# Patient Record
Sex: FEMALE | Race: BLACK OR AFRICAN AMERICAN | NOT HISPANIC OR LATINO | Employment: UNEMPLOYED | URBAN - METROPOLITAN AREA
[De-identification: names, ages, dates, MRNs, and addresses within clinical notes are randomized per-mention and may not be internally consistent; named-entity substitution may affect disease eponyms.]

---

## 2023-09-27 ENCOUNTER — HOSPITAL ENCOUNTER (EMERGENCY)
Facility: HOSPITAL | Age: 38
Discharge: HOME | End: 2023-09-27
Attending: EMERGENCY MEDICINE
Payer: MEDICAID

## 2023-09-27 ENCOUNTER — APPOINTMENT (EMERGENCY)
Dept: CT IMAGING | Facility: HOSPITAL | Age: 38
End: 2023-09-27
Payer: MEDICAID

## 2023-09-27 ENCOUNTER — APPOINTMENT (EMERGENCY)
Dept: RADIOLOGY | Facility: HOSPITAL | Age: 38
End: 2023-09-27
Payer: MEDICAID

## 2023-09-27 VITALS
SYSTOLIC BLOOD PRESSURE: 116 MMHG | RESPIRATION RATE: 22 BRPM | DIASTOLIC BLOOD PRESSURE: 70 MMHG | OXYGEN SATURATION: 94 % | TEMPERATURE: 97.9 F | HEART RATE: 84 BPM

## 2023-09-27 DIAGNOSIS — R07.89 ATYPICAL CHEST PAIN: Primary | ICD-10-CM

## 2023-09-27 LAB
ALBUMIN SERPL BCP-MCNC: 3.9 G/DL (ref 3.5–5)
ALP SERPL-CCNC: 76 U/L (ref 34–104)
ALT SERPL W P-5'-P-CCNC: 11 U/L (ref 7–52)
ANION GAP SERPL CALCULATED.3IONS-SCNC: 7 MMOL/L
AST SERPL W P-5'-P-CCNC: 24 U/L (ref 13–39)
ATRIAL RATE: 90 BPM
BASOPHILS # BLD AUTO: 0.03 THOUSANDS/ÂΜL (ref 0–0.1)
BASOPHILS NFR BLD AUTO: 0 % (ref 0–1)
BILIRUB SERPL-MCNC: 0.38 MG/DL (ref 0.2–1)
BUN SERPL-MCNC: 7 MG/DL (ref 5–25)
CALCIUM SERPL-MCNC: 9.3 MG/DL (ref 8.4–10.2)
CARDIAC TROPONIN I PNL SERPL HS: <2 NG/L
CARDIAC TROPONIN I PNL SERPL HS: <2 NG/L
CHLORIDE SERPL-SCNC: 104 MMOL/L (ref 96–108)
CO2 SERPL-SCNC: 26 MMOL/L (ref 21–32)
CREAT SERPL-MCNC: 0.65 MG/DL (ref 0.6–1.3)
D DIMER PPP FEU-MCNC: 0.38 UG/ML FEU
EOSINOPHIL # BLD AUTO: 0.03 THOUSAND/ÂΜL (ref 0–0.61)
EOSINOPHIL NFR BLD AUTO: 0 % (ref 0–6)
ERYTHROCYTE [DISTWIDTH] IN BLOOD BY AUTOMATED COUNT: 17 % (ref 11.6–15.1)
GFR SERPL CREATININE-BSD FRML MDRD: 112 ML/MIN/1.73SQ M
GLUCOSE SERPL-MCNC: 94 MG/DL (ref 65–140)
HCG SERPL QL: NEGATIVE
HCT VFR BLD AUTO: 38.7 % (ref 34.8–46.1)
HGB BLD-MCNC: 12 G/DL (ref 11.5–15.4)
IMM GRANULOCYTES # BLD AUTO: 0.03 THOUSAND/UL (ref 0–0.2)
IMM GRANULOCYTES NFR BLD AUTO: 0 % (ref 0–2)
LYMPHOCYTES # BLD AUTO: 2.57 THOUSANDS/ÂΜL (ref 0.6–4.47)
LYMPHOCYTES NFR BLD AUTO: 25 % (ref 14–44)
MCH RBC QN AUTO: 25.3 PG (ref 26.8–34.3)
MCHC RBC AUTO-ENTMCNC: 31 G/DL (ref 31.4–37.4)
MCV RBC AUTO: 82 FL (ref 82–98)
MONOCYTES # BLD AUTO: 0.81 THOUSAND/ÂΜL (ref 0.17–1.22)
MONOCYTES NFR BLD AUTO: 8 % (ref 4–12)
NEUTROPHILS # BLD AUTO: 6.86 THOUSANDS/ÂΜL (ref 1.85–7.62)
NEUTS SEG NFR BLD AUTO: 67 % (ref 43–75)
NRBC BLD AUTO-RTO: 0 /100 WBCS
P AXIS: 44 DEGREES
PLATELET # BLD AUTO: 384 THOUSANDS/UL (ref 149–390)
PMV BLD AUTO: 9.6 FL (ref 8.9–12.7)
POTASSIUM SERPL-SCNC: 3.9 MMOL/L (ref 3.5–5.3)
PR INTERVAL: 158 MS
PROT SERPL-MCNC: 8.3 G/DL (ref 6.4–8.4)
QRS AXIS: 24 DEGREES
QRSD INTERVAL: 78 MS
QT INTERVAL: 368 MS
QTC INTERVAL: 450 MS
RBC # BLD AUTO: 4.74 MILLION/UL (ref 3.81–5.12)
SODIUM SERPL-SCNC: 137 MMOL/L (ref 135–147)
T WAVE AXIS: 53 DEGREES
VENTRICULAR RATE: 90 BPM
WBC # BLD AUTO: 10.33 THOUSAND/UL (ref 4.31–10.16)

## 2023-09-27 PROCEDURE — 80053 COMPREHEN METABOLIC PANEL: CPT

## 2023-09-27 PROCEDURE — 71275 CT ANGIOGRAPHY CHEST: CPT

## 2023-09-27 PROCEDURE — 85025 COMPLETE CBC W/AUTO DIFF WBC: CPT

## 2023-09-27 PROCEDURE — 93005 ELECTROCARDIOGRAM TRACING: CPT

## 2023-09-27 PROCEDURE — 71046 X-RAY EXAM CHEST 2 VIEWS: CPT

## 2023-09-27 PROCEDURE — 96374 THER/PROPH/DIAG INJ IV PUSH: CPT

## 2023-09-27 PROCEDURE — 99285 EMERGENCY DEPT VISIT HI MDM: CPT

## 2023-09-27 PROCEDURE — 84484 ASSAY OF TROPONIN QUANT: CPT

## 2023-09-27 PROCEDURE — 84703 CHORIONIC GONADOTROPIN ASSAY: CPT

## 2023-09-27 PROCEDURE — 85379 FIBRIN DEGRADATION QUANT: CPT

## 2023-09-27 PROCEDURE — 93010 ELECTROCARDIOGRAM REPORT: CPT | Performed by: INTERNAL MEDICINE

## 2023-09-27 PROCEDURE — 36415 COLL VENOUS BLD VENIPUNCTURE: CPT

## 2023-09-27 PROCEDURE — 74174 CTA ABD&PLVS W/CONTRAST: CPT

## 2023-09-27 RX ORDER — FENTANYL CITRATE 50 UG/ML
25 INJECTION, SOLUTION INTRAMUSCULAR; INTRAVENOUS ONCE
Status: COMPLETED | OUTPATIENT
Start: 2023-09-27 | End: 2023-09-27

## 2023-09-27 RX ADMIN — IOHEXOL 100 ML: 350 INJECTION, SOLUTION INTRAVENOUS at 09:38

## 2023-09-27 RX ADMIN — FENTANYL CITRATE 25 MCG: 50 INJECTION INTRAMUSCULAR; INTRAVENOUS at 10:58

## 2023-09-27 NOTE — DISCHARGE INSTRUCTIONS
Follow up with PCP  Supportive care  Return to the ED with new or worsening symptoms including but not limited to worsening pain, shortness of breath, difficulty breathing     No acute pathology in the chest, abdomen or pelvis. No aortic aneurysm or dissection. 6 mm and 5 mm pulmonary nodules. Based on current Fleischner Society 2017 Guidelines on incidental pulmonary nodule, followup non-contrast CT is recommended at 6-12 months from the initial examination and, if stable at that time, an additional followup   is recommended for 18-24 months from the initial examination.

## 2023-09-27 NOTE — ED PROVIDER NOTES
History  Chief Complaint   Patient presents with   • Chest Pain     Pt reports pain in center of chest that began yesterday that is now radiating to back and left arm. Patient is a 40-year-old female past medical history of anemia presenting to the emergency room for evaluation of chest pain. Patient states yesterday morning she woke up with substernal chest pain. Patient states the pain worsened throughout the evening. Patient states that now radiates into her back, her left arm and to the left side. Patient reports she has never had pain like this in the past.  Patient does state when she takes a deep breath the pain worsens. Patient does report pain to palpation of the chest.  Patient does report she has MAD in place that is a hormonal birth control. Patient does report associated shortness of breath with the pain. Denies fevers, chills, rash, headache, weakness, dizziness, visual changes, abdominal pain, nausea, vomiting, diarrhea, constipation or difficulty breathing. Does not offer any other concerns or complaints. None       History reviewed. No pertinent past medical history. History reviewed. No pertinent surgical history. History reviewed. No pertinent family history. I have reviewed and agree with the history as documented. E-Cigarette/Vaping     E-Cigarette/Vaping Substances     Social History     Tobacco Use   • Smoking status: Never   • Smokeless tobacco: Never   Substance Use Topics   • Alcohol use: Not Currently   • Drug use: Never       Review of Systems   Constitutional: Negative for chills and fever. HENT: Negative for ear pain and sore throat. Eyes: Negative for pain and visual disturbance. Respiratory: Positive for shortness of breath. Negative for cough, chest tightness and wheezing. Cardiovascular: Positive for chest pain. Negative for palpitations. Gastrointestinal: Negative for abdominal pain, constipation, diarrhea, nausea and vomiting. Genitourinary: Negative for dysuria and hematuria. Musculoskeletal: Negative for arthralgias and back pain. Skin: Negative for color change and rash. Neurological: Negative for dizziness, seizures, syncope, weakness and headaches. All other systems reviewed and are negative. Physical Exam  Physical Exam  Vitals and nursing note reviewed. Constitutional:       General: She is not in acute distress. Appearance: Normal appearance. She is well-developed. She is not toxic-appearing or diaphoretic. HENT:      Head: Normocephalic and atraumatic. Right Ear: External ear normal.      Left Ear: External ear normal.      Nose: Nose normal.      Mouth/Throat:      Mouth: Mucous membranes are moist.   Eyes:      General: No scleral icterus. Right eye: No discharge. Left eye: No discharge. Conjunctiva/sclera: Conjunctivae normal.   Cardiovascular:      Rate and Rhythm: Normal rate and regular rhythm. Heart sounds: No murmur heard. Pulmonary:      Effort: Pulmonary effort is normal. No respiratory distress. Breath sounds: Normal breath sounds. No decreased breath sounds, wheezing, rhonchi or rales. Chest:      Chest wall: Tenderness present. Abdominal:      Palpations: Abdomen is soft. Tenderness: There is no abdominal tenderness. Musculoskeletal:         General: No swelling, deformity or signs of injury. Normal range of motion. Cervical back: Normal range of motion and neck supple. No rigidity. Skin:     General: Skin is warm and dry. Capillary Refill: Capillary refill takes less than 2 seconds. Coloration: Skin is not jaundiced. Findings: No erythema or rash. Neurological:      General: No focal deficit present. Mental Status: She is alert and oriented to person, place, and time. Mental status is at baseline. Cranial Nerves: No cranial nerve deficit.       Gait: Gait normal.   Psychiatric:         Mood and Affect: Mood normal. Behavior: Behavior normal.         Thought Content:  Thought content normal.         Judgment: Judgment normal.         Vital Signs  ED Triage Vitals [09/27/23 0832]   Temperature Pulse Respirations Blood Pressure SpO2   97.9 °F (36.6 °C) 87 17 121/83 100 %      Temp Source Heart Rate Source Patient Position - Orthostatic VS BP Location FiO2 (%)   Temporal Monitor Lying Right arm --      Pain Score       --           Vitals:    09/27/23 0832 09/27/23 0945   BP: 121/83 116/70   Pulse: 87 84   Patient Position - Orthostatic VS: Lying          Visual Acuity      ED Medications  Medications   iohexol (OMNIPAQUE) 350 MG/ML injection (MULTI-DOSE) 100 mL (100 mL Intravenous Given 9/27/23 0938)   fentanyl citrate (PF) 100 MCG/2ML 25 mcg (25 mcg Intravenous Given 9/27/23 1058)       Diagnostic Studies  Results Reviewed     Procedure Component Value Units Date/Time    HS Troponin I 2hr [[id number]] Collected: 09/27/23 1040    Lab Status: Final result Specimen: Blood from Arm, Right Updated: 09/27/23 1114     hs TnI 2hr <2 ng/L      Delta 2hr hsTnI --    D-Dimer [[id number]]  (Normal) Collected: 09/27/23 0836    Lab Status: Final result Specimen: Blood from Arm, Right Updated: 09/27/23 1046     D-Dimer, Quant 0.38 ug/ml FEU     hCG, qualitative pregnancy [[id number]]  (Normal) Collected: 09/27/23 0836    Lab Status: Final result Specimen: Blood from Arm, Right Updated: 09/27/23 1045     Preg, Serum Negative    HS Troponin I 4hr [[id number]]     Lab Status: No result Specimen: Blood     HS Troponin 0hr (reflex protocol) [[id number]]  (Normal) Collected: 09/27/23 0836    Lab Status: Final result Specimen: Blood from Arm, Right Updated: 09/27/23 0912     hs TnI 0hr <2 ng/L     Comprehensive metabolic panel [[id number]] Collected: 09/27/23 0836    Lab Status: Final result Specimen: Blood from Arm, Right Updated: 09/27/23 0904     Sodium 137 mmol/L      Potassium 3.9 mmol/L      Chloride 104 mmol/L      CO2 26 mmol/L      ANION GAP 7 mmol/L      BUN 7 mg/dL      Creatinine 0.65 mg/dL      Glucose 94 mg/dL      Calcium 9.3 mg/dL      AST 24 U/L      ALT 11 U/L      Alkaline Phosphatase 76 U/L      Total Protein 8.3 g/dL      Albumin 3.9 g/dL      Total Bilirubin 0.38 mg/dL      eGFR 112 ml/min/1.73sq m     Narrative:      National Kidney Disease Foundation guidelines for Chronic Kidney Disease (CKD):   •  Stage 1 with normal or high GFR (GFR > 90 mL/min/1.73 square meters)  •  Stage 2 Mild CKD (GFR = 60-89 mL/min/1.73 square meters)  •  Stage 3A Moderate CKD (GFR = 45-59 mL/min/1.73 square meters)  •  Stage 3B Moderate CKD (GFR = 30-44 mL/min/1.73 square meters)  •  Stage 4 Severe CKD (GFR = 15-29 mL/min/1.73 square meters)  •  Stage 5 End Stage CKD (GFR <15 mL/min/1.73 square meters)  Note: GFR calculation is accurate only with a steady state creatinine    CBC and differential [[id number]]  (Abnormal) Collected: 09/27/23 0836    Lab Status: Final result Specimen: Blood from Arm, Right Updated: 09/27/23 0844     WBC 10.33 Thousand/uL      RBC 4.74 Million/uL      Hemoglobin 12.0 g/dL      Hematocrit 38.7 %      MCV 82 fL      MCH 25.3 pg      MCHC 31.0 g/dL      RDW 17.0 %      MPV 9.6 fL      Platelets 701 Thousands/uL      nRBC 0 /100 WBCs      Neutrophils Relative 67 %      Immat GRANS % 0 %      Lymphocytes Relative 25 %      Monocytes Relative 8 %      Eosinophils Relative 0 %      Basophils Relative 0 %      Neutrophils Absolute 6.86 Thousands/µL      Immature Grans Absolute 0.03 Thousand/uL      Lymphocytes Absolute 2.57 Thousands/µL      Monocytes Absolute 0.81 Thousand/µL      Eosinophils Absolute 0.03 Thousand/µL      Basophils Absolute 0.03 Thousands/µL                  CTA dissection protocol chest/abdomen/pelvis   Final Result by Ayan Lloyd MD (09/27 1924)      No acute pathology in the chest, abdomen or pelvis. No aortic aneurysm or dissection. 6 mm and 5 mm pulmonary nodules.  Based on current Fleischner Society 2017 Guidelines on incidental pulmonary nodule, followup non-contrast CT is recommended at 6-12 months from the initial examination and, if stable at that time, an additional followup    is recommended for 18-24 months from the initial examination. The study was marked in [facility] for immediate notification and follow-up regarding the pulmonary nodules. Workstation performed: [id number]         XR chest 2 views   Final Result by Ney Lyons MD (09/27 1007)      No acute cardiopulmonary disease. Resident: Jude Vega, the attending radiologist, have reviewed the images and agree with the final report above. Workstation performed: [id number]                    Procedures  ECG 12 Lead Documentation Only    Date/Time: 9/27/2023 8:32 AM    Performed by: Jane Barr PA-C  Authorized by: Jane Barr PA-C    Indications / Diagnosis:  Chest Pain  ECG reviewed by me, the ED Provider: yes    Patient location:  ED  Previous ECG:     Previous ECG:  Unavailable  Interpretation:     Interpretation: abnormal    Rate:     ECG rate:  90    ECG rate assessment: normal    Rhythm:     Rhythm: sinus rhythm    Ectopy:     Ectopy: none    QRS:     QRS axis:  Normal    QRS intervals:  Normal  Conduction:     Conduction: normal    ST segments:     ST segments:  Normal  T waves:     T waves: non-specific               ED Course  ED Course as of 09/27/23 1123   Wed Sep 27, 2023   1036 hs TnI 0hr: <2   1054 Continued discomfort, now requesting pain medication. Will reevaluate after medication and delta.               HEART Risk Score    Flowsheet Row Most Recent Value   Heart Score Risk Calculator    History 1 Filed at: 09/27/2023 0840   ECG 1 Filed at: 09/27/2023 0840   Age 0 Filed at: 09/27/2023 0840   Risk Factors 0 Filed at: 09/27/2023 0840   Troponin 0 Filed at: 09/27/2023 0840   HEART Score 2 Filed at: 09/27/2023 0840            SBIRT 22yo+    Flowsheet Row Most Recent Value   Initial Alcohol Screen: US AUDIT-C     1. How often do you have a drink containing alcohol? 0 Filed at: 09/27/2023 0840   2. How many drinks containing alcohol do you have on a typical day you are drinking? 0 Filed at: 09/27/2023 0840   3a. Male UNDER 65: How often do you have five or more drinks on one occasion? 0 Filed at: 09/27/2023 0840   3b. FEMALE Any Age, or MALE 65+: How often do you have 4 or more drinks on one occassion? 0 Filed at: 09/27/2023 0840   Audit-C Score 0 Filed at: 09/27/2023 0840   PEG: How many times in the past year have you. .. Used an illegal drug or used a prescription medication for non-medical reasons? Never Filed at: 09/27/2023 0840                    Medical Decision Making    This is a 80-year-old female present to the emergency room for evaluation of chest pain. Patient states she began having chest pain yesterday that had worsened throughout the night. Patient states that she has radiation into her back and left arm. Patient does state it radiates to the left side of her chest.  Patient reports tenderness to her chest with palpation. Patient denies any recent heavy lifting or trauma. Patient reports worsening pain with deep inspiration. Patient is in no acute distress on initial examination, stable vital signs. Differential diagnosis to include but is not limited to: ACS, STEMI, Pneumothorax, Pneumonia, Pleural effusion, Pericarditis, Pericardial effusion, Chest wall pain/costochondritis, Esophageal spasm, Pulmonary embolism, Bronchitis/bronchospasm, costochondritis    Initial ED Plan: imaging, labs, ekg    ED results:  0 hour troponin: <2  Heart score: 2  No acute pathology in the chest, abdomen or pelvis. No aortic aneurysm or dissection. 6 mm and 5 mm pulmonary nodules.  Based on current Fleischner Society 2017 Guidelines on incidental pulmonary nodule, followup non-contrast CT is recommended at 6-12 months from the initial examination and, if stable at that time, an additional followup   is recommended for 18-24 months from the initial examination.    -Patient reports improvement after medication. Final ED assessment: Patient is stable and well appearing. Discussed radiologic studies and laboratory results. Discussed follow-up with PCP, discussed supportive care. Strict return precautions were discussed including but not limited to chest pain, shortness of breath, difficulty breathing. Patient verbalized understanding and is agreeable with the plan for discharge. Amount and/or Complexity of Data Reviewed  Labs: ordered. Decision-making details documented in ED Course. Radiology: ordered. Risk  Prescription drug management. Disposition  Final diagnoses:   Atypical chest pain     Time reflects when diagnosis was documented in both MDM as applicable and the Disposition within this note     Time User Action Codes Description Comment    9/27/2023 11:19 AM Norma Tripathi Add [R07.89] Atypical chest pain       ED Disposition     ED Disposition   Discharge    Condition   Stable    Date/Time   Wed Sep 27, 2023 11:19 AM    De Giron Desailrosana discharge to home/self care. Follow-up Information     Follow up With Specialties Details Why Contact Info Additional Information    your PCP  Call in 3 days For follow up      [address] [place] [address] Emergency Department Emergency Medicine Go to  If symptoms worsen [address] Emergency Department, [place], [place], [address]          Patient's Medications    No medications on file       No discharge procedures on file.     PDMP Review     None          ED Provider  Electronically Signed by           Jovan Suarez PA-C  09/27/23 0074